# Patient Record
Sex: MALE | Race: BLACK OR AFRICAN AMERICAN | NOT HISPANIC OR LATINO | URBAN - METROPOLITAN AREA
[De-identification: names, ages, dates, MRNs, and addresses within clinical notes are randomized per-mention and may not be internally consistent; named-entity substitution may affect disease eponyms.]

---

## 2021-08-10 ENCOUNTER — EMERGENCY (EMERGENCY)
Facility: HOSPITAL | Age: 61
LOS: 1 days | Discharge: LEFT BEFORE DISCHARGE | End: 2021-08-10
Attending: EMERGENCY MEDICINE | Admitting: EMERGENCY MEDICINE
Payer: MEDICAID

## 2021-08-10 VITALS
SYSTOLIC BLOOD PRESSURE: 162 MMHG | DIASTOLIC BLOOD PRESSURE: 105 MMHG | OXYGEN SATURATION: 94 % | RESPIRATION RATE: 19 BRPM | HEART RATE: 89 BPM

## 2021-08-10 VITALS
HEART RATE: 87 BPM | SYSTOLIC BLOOD PRESSURE: 155 MMHG | DIASTOLIC BLOOD PRESSURE: 122 MMHG | RESPIRATION RATE: 18 BRPM | TEMPERATURE: 98 F | WEIGHT: 164.91 LBS | OXYGEN SATURATION: 100 %

## 2021-08-10 LAB
ALBUMIN SERPL ELPH-MCNC: 3.5 G/DL — SIGNIFICANT CHANGE UP (ref 3.3–5)
ALP SERPL-CCNC: 93 U/L — SIGNIFICANT CHANGE UP (ref 40–120)
ALT FLD-CCNC: 56 U/L — SIGNIFICANT CHANGE UP (ref 12–78)
ANION GAP SERPL CALC-SCNC: 6 MMOL/L — SIGNIFICANT CHANGE UP (ref 5–17)
AST SERPL-CCNC: 32 U/L — SIGNIFICANT CHANGE UP (ref 15–37)
BASOPHILS # BLD AUTO: 0.03 K/UL — SIGNIFICANT CHANGE UP (ref 0–0.2)
BASOPHILS NFR BLD AUTO: 0.6 % — SIGNIFICANT CHANGE UP (ref 0–2)
BILIRUB SERPL-MCNC: 0.7 MG/DL — SIGNIFICANT CHANGE UP (ref 0.2–1.2)
BUN SERPL-MCNC: 13 MG/DL — SIGNIFICANT CHANGE UP (ref 7–23)
CALCIUM SERPL-MCNC: 8.1 MG/DL — LOW (ref 8.5–10.1)
CHLORIDE SERPL-SCNC: 112 MMOL/L — HIGH (ref 96–108)
CO2 SERPL-SCNC: 27 MMOL/L — SIGNIFICANT CHANGE UP (ref 22–31)
CREAT SERPL-MCNC: 0.71 MG/DL — SIGNIFICANT CHANGE UP (ref 0.5–1.3)
EOSINOPHIL # BLD AUTO: 0.09 K/UL — SIGNIFICANT CHANGE UP (ref 0–0.5)
EOSINOPHIL NFR BLD AUTO: 1.8 % — SIGNIFICANT CHANGE UP (ref 0–6)
GLUCOSE SERPL-MCNC: 105 MG/DL — HIGH (ref 70–99)
HCT VFR BLD CALC: 44 % — SIGNIFICANT CHANGE UP (ref 39–50)
HGB BLD-MCNC: 14.6 G/DL — SIGNIFICANT CHANGE UP (ref 13–17)
IMM GRANULOCYTES NFR BLD AUTO: 0.4 % — SIGNIFICANT CHANGE UP (ref 0–1.5)
LYMPHOCYTES # BLD AUTO: 1.5 K/UL — SIGNIFICANT CHANGE UP (ref 1–3.3)
LYMPHOCYTES # BLD AUTO: 30 % — SIGNIFICANT CHANGE UP (ref 13–44)
MCHC RBC-ENTMCNC: 30.5 PG — SIGNIFICANT CHANGE UP (ref 27–34)
MCHC RBC-ENTMCNC: 33.2 GM/DL — SIGNIFICANT CHANGE UP (ref 32–36)
MCV RBC AUTO: 92.1 FL — SIGNIFICANT CHANGE UP (ref 80–100)
MONOCYTES # BLD AUTO: 0.38 K/UL — SIGNIFICANT CHANGE UP (ref 0–0.9)
MONOCYTES NFR BLD AUTO: 7.6 % — SIGNIFICANT CHANGE UP (ref 2–14)
NEUTROPHILS # BLD AUTO: 2.98 K/UL — SIGNIFICANT CHANGE UP (ref 1.8–7.4)
NEUTROPHILS NFR BLD AUTO: 59.6 % — SIGNIFICANT CHANGE UP (ref 43–77)
NRBC # BLD: 0 /100 WBCS — SIGNIFICANT CHANGE UP (ref 0–0)
NT-PROBNP SERPL-SCNC: 1724 PG/ML — HIGH (ref 0–125)
PLATELET # BLD AUTO: 205 K/UL — SIGNIFICANT CHANGE UP (ref 150–400)
POTASSIUM SERPL-MCNC: 4.3 MMOL/L — SIGNIFICANT CHANGE UP (ref 3.5–5.3)
POTASSIUM SERPL-SCNC: 4.3 MMOL/L — SIGNIFICANT CHANGE UP (ref 3.5–5.3)
PROT SERPL-MCNC: 6.3 G/DL — SIGNIFICANT CHANGE UP (ref 6–8.3)
RBC # BLD: 4.78 M/UL — SIGNIFICANT CHANGE UP (ref 4.2–5.8)
RBC # FLD: 13.6 % — SIGNIFICANT CHANGE UP (ref 10.3–14.5)
SARS-COV-2 RNA SPEC QL NAA+PROBE: SIGNIFICANT CHANGE UP
SODIUM SERPL-SCNC: 145 MMOL/L — SIGNIFICANT CHANGE UP (ref 135–145)
TROPONIN I SERPL-MCNC: <.015 NG/ML — SIGNIFICANT CHANGE UP (ref 0.01–0.04)
WBC # BLD: 5 K/UL — SIGNIFICANT CHANGE UP (ref 3.8–10.5)
WBC # FLD AUTO: 5 K/UL — SIGNIFICANT CHANGE UP (ref 3.8–10.5)

## 2021-08-10 PROCEDURE — 84484 ASSAY OF TROPONIN QUANT: CPT

## 2021-08-10 PROCEDURE — 93010 ELECTROCARDIOGRAM REPORT: CPT

## 2021-08-10 PROCEDURE — 99284 EMERGENCY DEPT VISIT MOD MDM: CPT | Mod: 25

## 2021-08-10 PROCEDURE — U0005: CPT

## 2021-08-10 PROCEDURE — 80053 COMPREHEN METABOLIC PANEL: CPT

## 2021-08-10 PROCEDURE — 71046 X-RAY EXAM CHEST 2 VIEWS: CPT

## 2021-08-10 PROCEDURE — U0003: CPT

## 2021-08-10 PROCEDURE — 83880 ASSAY OF NATRIURETIC PEPTIDE: CPT

## 2021-08-10 PROCEDURE — 36415 COLL VENOUS BLD VENIPUNCTURE: CPT

## 2021-08-10 PROCEDURE — 71046 X-RAY EXAM CHEST 2 VIEWS: CPT | Mod: 26

## 2021-08-10 PROCEDURE — 99285 EMERGENCY DEPT VISIT HI MDM: CPT

## 2021-08-10 PROCEDURE — 93005 ELECTROCARDIOGRAM TRACING: CPT

## 2021-08-10 PROCEDURE — 85025 COMPLETE CBC W/AUTO DIFF WBC: CPT

## 2021-08-10 RX ORDER — ALBUTEROL 90 UG/1
2 AEROSOL, METERED ORAL ONCE
Refills: 0 | Status: DISCONTINUED | OUTPATIENT
Start: 2021-08-10 | End: 2021-08-14

## 2021-08-10 RX ORDER — FUROSEMIDE 40 MG
1 TABLET ORAL
Qty: 0 | Refills: 0 | DISCHARGE

## 2021-08-10 RX ORDER — LISINOPRIL 2.5 MG/1
1 TABLET ORAL
Qty: 0 | Refills: 0 | DISCHARGE

## 2021-08-10 RX ORDER — BUDESONIDE, MICRONIZED 100 %
2 POWDER (GRAM) MISCELLANEOUS
Qty: 1 | Refills: 0
Start: 2021-08-10

## 2021-08-10 RX ORDER — ATORVASTATIN CALCIUM 80 MG/1
1 TABLET, FILM COATED ORAL
Qty: 0 | Refills: 0 | DISCHARGE

## 2021-08-10 RX ORDER — ALBUTEROL 90 UG/1
2 AEROSOL, METERED ORAL
Qty: 1 | Refills: 0
Start: 2021-08-10

## 2021-08-10 RX ORDER — MOMETASONE FUROATE 220 UG/1
1 INHALANT RESPIRATORY (INHALATION) DAILY
Refills: 0 | Status: DISCONTINUED | OUTPATIENT
Start: 2021-08-10 | End: 2021-08-14

## 2021-08-10 RX ORDER — CARVEDILOL PHOSPHATE 80 MG/1
1 CAPSULE, EXTENDED RELEASE ORAL
Qty: 0 | Refills: 0 | DISCHARGE

## 2021-08-10 NOTE — ED ADULT NURSE NOTE - OBJECTIVE STATEMENT
Pt received in bed alert and oriented with the c/o mid chest pressure which started since today. Pt states that the pain doesn't radiate anywhere and it is causing great discomfort. As per Md's orders IV tanner placed blood specimen obtained and sent to the lab. Nursing care ongoing and safety maintained.

## 2021-08-10 NOTE — ED PROVIDER NOTE - PATIENT PORTAL LINK FT
You can access the FollowMyHealth Patient Portal offered by [facility] by registering at the following website: [email or web address]. By joining [facility]’s FollowMyHealth portal, you will also be able to view your health information using other applications (apps) compatible with our system.

## 2021-08-10 NOTE — ED PROVIDER NOTE - NSFOLLOWUPINSTRUCTIONS_ED_ALL_ED_FT
You are leaving against medical advice. You have verbalized that you understand the risks of this action, including but not limited to organ failure, disability, coma and death.   Please follow up with your primary care physician tomorrow or return if you want to finish your medical evaluation.    Acute Bronchitis, Adult       Acute bronchitis is sudden or acute swelling of the air tubes (bronchi) in the lungs. Acute bronchitis causes these tubes to fill with mucus, which can make it hard to breathe. It can also cause coughing or wheezing.    In adults, acute bronchitis usually goes away within 2 weeks. A cough caused by bronchitis may last up to 3 weeks. Smoking, allergies, and asthma can make the condition worse.      What are the causes?  This condition can be caused by germs and by substances that irritate the lungs, including:  •Cold and flu viruses. The most common cause of this condition is the virus that causes the common cold.      •Bacteria.    •Substances that irritate the lungs, including:  •Smoke from cigarettes and other forms of tobacco.      •Dust and pollen.      •Fumes from chemical products, gases, or burned fuel.      •Other materials that pollute indoor or outdoor air.        •Close contact with someone who has acute bronchitis.        What increases the risk?  The following factors may make you more likely to develop this condition:  •A weak body's defense system, also called the immune system.      •A condition that affects your lungs and breathing, such as asthma.        What are the signs or symptoms?  Common symptoms of this condition include:•Lung and breathing problems, such as:  •Coughing. This may bring up clear, yellow, or green mucus from your lungs (sputum).      •Wheezing.      •Having too much mucus in your lungs (chest congestion).      •Having shortness of breath.        •A fever.      •Chills.    •Aches and pains, including:  •Tightness in your chest and other body aches.      •A sore throat.          How is this diagnosed?  This condition is usually diagnosed based on:  •Your symptoms and medical history.      •A physical exam.    You may also have other tests, including tests to rule out other conditions, such pneumonia. These tests include:  •A test of lung function.      •Test of a mucus sample to look for the presence of bacteria.      •Tests to check the oxygen level in your blood.      •Blood tests.      •Chest X-ray.        How is this treated?  Most cases of acute bronchitis clear up over time without treatment. Your health care provider may recommend:  •Drinking more fluids. This can thin your mucus, which may improve your breathing.      •Taking a medicine for a fever or cough.      •Using a device that gets medicine into your lungs (inhaler) to help improve breathing and control coughing.      •Using a vaporizer or a humidifier. These are machines that add water to the air to help you breathe better.        Follow these instructions at home:    Activity     •Get plenty of rest.      •Return to your normal activities as told by your health care provider. Ask your health care provider what activities are safe for you.      Lifestyle     •Drink enough fluid to keep your urine pale yellow.      • Do not drink alcohol.    • Do not use any products that contain nicotine or tobacco, such as cigarettes, e-cigarettes, and chewing tobacco. If you need help quitting, ask your health care provider. Be aware that:  •Your bronchitis will get worse if you smoke or breathe in other people's smoke (secondhand smoke).      •Your lungs will heal faster if you quit smoking.          General instructions      •Take over-the-counter and prescription medicines only as told by your health care provider.      •Use an inhaler, vaporizer, or humidifier as told by your health care provider.      •If you have a sore throat, gargle with a salt-water mixture 3–4 times a day or as needed. To make a salt-water mixture, completely dissolve ½–1 tsp (3–6 g) of salt in 1 cup (237 mL) of warm water.      •Keep all follow-up visits as told by your health care provider. This is important.        How is this prevented?  To lower your risk of getting this condition again:  •Wash your hands often with soap and water. If soap and water are not available, use hand .      •Avoid contact with people who have cold symptoms.      •Try not to touch your mouth, nose, or eyes with your hands.      •Avoid places where there are fumes from chemicals. Breathing these fumes will make your condition worse.      •Get the flu shot every year.        Contact a health care provider if:    •Your symptoms do not improve after 2 weeks of treatment.      •You vomit more than once or twice.    •You have symptoms of dehydration such as:  •Dark urine.      •Dry skin or eyes.      •Increased thirst.      •Headaches.      •Confusion.      •Muscle cramps.          Get help right away if you:    •Cough up blood.      •Feel pain in your chest.      •Have severe shortness of breath.      •Faint or keep feeling like you are going to faint.      •Have a severe headache.      •Have fever or chills that get worse.      These symptoms may represent a serious problem that is an emergency. Do not wait to see if the symptoms will go away. Get medical help right away. Call your local emergency services (911 in the [place] Do not drive yourself to the hospital.       Summary    •Acute bronchitis is sudden (acute) inflammation of the air tubes (bronchi) between the windpipe and the lungs. In adults, acute bronchitis usually goes away within 2 weeks, although coughing may last 3 weeks or longer      •Take over-the-counter and prescription medicines only as told by your health care provider.      •Drink enough fluid to keep your urine pale yellow.      •Contact a health care provider if your symptoms do not improve after 2 weeks of treatment.      •Get help right away if you cough up blood, faint, or have chest pain or shortness of breath.      This information is not intended to replace advice given to you by your health care provider. Make sure you discuss any questions you have with your health care provider.

## 2021-08-10 NOTE — ED PROVIDER NOTE - OBJECTIVE STATEMENT
60 yo M PMHx HTN, HLD presents to ED c/o episode of chest pressure, shortness of breath x 4 hours prior to arrival. Pt states "I felt like I was having an anxiety attack." Pt states symptoms have since resolved and he is now asymptomatic. Pt admits to chronic cough, reports that he quit smoking last week after decades. Pt denies fever/chills, abd pain, back pain, HA, dizziness. Pt not vaccinated against covid

## 2021-08-10 NOTE — ED PROVIDER NOTE - CLINICAL SUMMARY MEDICAL DECISION MAKING FREE TEXT BOX
Patient is alert and competent. Discussed at length that leaving prior to continuing treatment could result in, but not limited to, further deterioration and/or death. Patient expressed understanding and still wants to sign out against medical advice. Advised patient to follow up with their primary care physician tomorrow or return at any time if they change their mind and want to continue the treatment/work up. 60 yo M p/w episode of chest pain, SOB resolved now-- ACS vs bronchitis ---> workup in progress plan for trop x 2, however pt states he does not want to wait any longer, requesting dc - Patient is alert and competent. Discussed at length that leaving prior to continuing treatment could result in, but not limited to, further deterioration and/or death. Patient expressed understanding and still wants to sign out against medical advice. Advised patient to follow up with their primary care physician tomorrow or return at any time if they change their mind and want to continue the treatment/work up.

## 2021-08-10 NOTE — ED PROVIDER NOTE - ATTENDING CONTRIBUTION TO CARE
I have personally performed a face to face diagnostic evaluation on this patient.  I have reviewed the PA note and agree with the history, exam, and plan of care, except as noted.  History and Exam by me shows  chest pressure and sob today.  pt is in nad.  A n o x 3.  pt was on the phone c his boss.    lungs- diffuse wheezing.  cxr- wnl.  labs were unremarkable, except bnp [id number].   Pending repeat ce and further eval.  pt didn't want to wait and left AMA.

## 2021-08-10 NOTE — ED PROVIDER NOTE - MUSCULOSKELETAL NEGATIVE STATEMENT, MLM
Attempted to contact patient to schedule EGD. Received busy signal.   
no back pain, no gout, no musculoskeletal pain, no neck pain, and no weakness.

## 2023-12-26 NOTE — ED PROVIDER NOTE - NS_EDPROVIDERDISPOUSERTYPE_ED_A_ED
Pre-application: Motor, sensory, and vascular responses intact in the injured extremity./Post-application: Motor, sensory, and vascular responses intact in the injured extremity./The patient/caregiver verbalized understanding of how to care for the injured extremity with splint Attending Attestation (For Attendings USE Only)...